# Patient Record
Sex: MALE | Race: WHITE | NOT HISPANIC OR LATINO | URBAN - NONMETROPOLITAN AREA
[De-identification: names, ages, dates, MRNs, and addresses within clinical notes are randomized per-mention and may not be internally consistent; named-entity substitution may affect disease eponyms.]

---

## 2019-12-30 ENCOUNTER — OUTSIDE FACILITY SERVICE (OUTPATIENT)
Dept: CARDIOLOGY | Facility: CLINIC | Age: 74
End: 2019-12-30

## 2019-12-30 PROCEDURE — 78452 HT MUSCLE IMAGE SPECT MULT: CPT | Performed by: INTERNAL MEDICINE

## 2019-12-30 PROCEDURE — 93018 CV STRESS TEST I&R ONLY: CPT | Performed by: INTERNAL MEDICINE

## 2021-02-17 DIAGNOSIS — Z23 IMMUNIZATION DUE: ICD-10-CM

## 2021-08-03 ENCOUNTER — TRANSCRIBE ORDERS (OUTPATIENT)
Dept: ADMINISTRATIVE | Facility: HOSPITAL | Age: 76
End: 2021-08-03

## 2021-08-03 DIAGNOSIS — Z01.818 PRE-OPERATIVE CLEARANCE: Primary | ICD-10-CM

## 2021-08-06 ENCOUNTER — LAB (OUTPATIENT)
Dept: LAB | Facility: HOSPITAL | Age: 76
End: 2021-08-06

## 2021-08-06 DIAGNOSIS — Z01.818 PRE-OPERATIVE CLEARANCE: ICD-10-CM

## 2021-08-06 LAB — SARS-COV-2 RNA PNL SPEC NAA+PROBE: NOT DETECTED

## 2021-08-06 PROCEDURE — U0004 COV-19 TEST NON-CDC HGH THRU: HCPCS | Performed by: OPHTHALMOLOGY

## 2021-08-06 PROCEDURE — C9803 HOPD COVID-19 SPEC COLLECT: HCPCS

## 2023-05-04 ENCOUNTER — APPOINTMENT (OUTPATIENT)
Dept: GENERAL RADIOLOGY | Facility: HOSPITAL | Age: 78
End: 2023-05-04
Payer: MEDICARE

## 2023-05-04 ENCOUNTER — HOSPITAL ENCOUNTER (EMERGENCY)
Facility: HOSPITAL | Age: 78
Discharge: HOME | End: 2023-05-05
Attending: EMERGENCY MEDICINE
Payer: MEDICARE

## 2023-05-04 DIAGNOSIS — J20.4 PARAINFLUENZA VIRUS BRONCHITIS: Primary | ICD-10-CM

## 2023-05-04 LAB
ALBUMIN SERPL-MCNC: 3.8 G/DL (ref 3.5–5.2)
ALBUMIN/GLOB SERPL: 1.1 G/DL
ALP SERPL-CCNC: 80 U/L (ref 39–117)
ALT SERPL W P-5'-P-CCNC: 17 U/L (ref 1–41)
ANION GAP SERPL CALCULATED.3IONS-SCNC: 10.1 MMOL/L (ref 5–15)
AST SERPL-CCNC: 21 U/L (ref 1–40)
B PARAPERT DNA SPEC QL NAA+PROBE: NOT DETECTED
B PERT DNA SPEC QL NAA+PROBE: NOT DETECTED
BACTERIA UR QL AUTO: ABNORMAL /HPF
BASOPHILS # BLD AUTO: 0.05 10*3/MM3 (ref 0–0.2)
BASOPHILS NFR BLD AUTO: 0.8 % (ref 0–1.5)
BILIRUB SERPL-MCNC: 0.3 MG/DL (ref 0–1.2)
BILIRUB UR QL STRIP: NEGATIVE
BUN SERPL-MCNC: 9 MG/DL (ref 8–23)
BUN/CREAT SERPL: 7.1 (ref 7–25)
C PNEUM DNA NPH QL NAA+NON-PROBE: NOT DETECTED
CALCIUM SPEC-SCNC: 8.9 MG/DL (ref 8.6–10.5)
CHLORIDE SERPL-SCNC: 104 MMOL/L (ref 98–107)
CLARITY UR: CLEAR
CO2 SERPL-SCNC: 24.9 MMOL/L (ref 22–29)
COLOR UR: YELLOW
CREAT SERPL-MCNC: 1.27 MG/DL (ref 0.76–1.27)
CRP SERPL-MCNC: <0.3 MG/DL (ref 0–0.5)
D DIMER PPP FEU-MCNC: 0.34 MCGFEU/ML (ref 0–0.75)
DEPRECATED RDW RBC AUTO: 44.5 FL (ref 37–54)
EGFRCR SERPLBLD CKD-EPI 2021: 58.9 ML/MIN/1.73
EOSINOPHIL # BLD AUTO: 0.35 10*3/MM3 (ref 0–0.4)
EOSINOPHIL NFR BLD AUTO: 5.9 % (ref 0.3–6.2)
ERYTHROCYTE [DISTWIDTH] IN BLOOD BY AUTOMATED COUNT: 12.6 % (ref 12.3–15.4)
ERYTHROCYTE [SEDIMENTATION RATE] IN BLOOD: 19 MM/HR (ref 0–20)
FLUAV SUBTYP SPEC NAA+PROBE: NOT DETECTED
FLUBV RNA ISLT QL NAA+PROBE: NOT DETECTED
GEN 5 2HR TROPONIN T REFLEX: 21 NG/L
GLOBULIN UR ELPH-MCNC: 3.5 GM/DL
GLUCOSE SERPL-MCNC: 93 MG/DL (ref 65–99)
GLUCOSE UR STRIP-MCNC: NEGATIVE MG/DL
HADV DNA SPEC NAA+PROBE: NOT DETECTED
HCOV 229E RNA SPEC QL NAA+PROBE: NOT DETECTED
HCOV HKU1 RNA SPEC QL NAA+PROBE: NOT DETECTED
HCOV NL63 RNA SPEC QL NAA+PROBE: NOT DETECTED
HCOV OC43 RNA SPEC QL NAA+PROBE: NOT DETECTED
HCT VFR BLD AUTO: 48 % (ref 37.5–51)
HGB BLD-MCNC: 16.4 G/DL (ref 13–17.7)
HGB UR QL STRIP.AUTO: NEGATIVE
HMPV RNA NPH QL NAA+NON-PROBE: NOT DETECTED
HPIV1 RNA ISLT QL NAA+PROBE: DETECTED
HPIV2 RNA SPEC QL NAA+PROBE: NOT DETECTED
HPIV3 RNA NPH QL NAA+PROBE: NOT DETECTED
HPIV4 P GENE NPH QL NAA+PROBE: NOT DETECTED
HYALINE CASTS UR QL AUTO: ABNORMAL /LPF
IMM GRANULOCYTES # BLD AUTO: 0.02 10*3/MM3 (ref 0–0.05)
IMM GRANULOCYTES NFR BLD AUTO: 0.3 % (ref 0–0.5)
KETONES UR QL STRIP: NEGATIVE
LEUKOCYTE ESTERASE UR QL STRIP.AUTO: ABNORMAL
LYMPHOCYTES # BLD AUTO: 3.14 10*3/MM3 (ref 0.7–3.1)
LYMPHOCYTES NFR BLD AUTO: 53 % (ref 19.6–45.3)
M PNEUMO IGG SER IA-ACNC: NOT DETECTED
MAGNESIUM SERPL-MCNC: 2.1 MG/DL (ref 1.6–2.4)
MCH RBC QN AUTO: 32.7 PG (ref 26.6–33)
MCHC RBC AUTO-ENTMCNC: 34.2 G/DL (ref 31.5–35.7)
MCV RBC AUTO: 95.8 FL (ref 79–97)
MONOCYTES # BLD AUTO: 0.66 10*3/MM3 (ref 0.1–0.9)
MONOCYTES NFR BLD AUTO: 11.1 % (ref 5–12)
NEUTROPHILS NFR BLD AUTO: 1.7 10*3/MM3 (ref 1.7–7)
NEUTROPHILS NFR BLD AUTO: 28.9 % (ref 42.7–76)
NITRITE UR QL STRIP: NEGATIVE
NRBC BLD AUTO-RTO: 0 /100 WBC (ref 0–0.2)
NT-PROBNP SERPL-MCNC: 179.7 PG/ML (ref 0–1800)
PH UR STRIP.AUTO: 6 [PH] (ref 5–8)
PLATELET # BLD AUTO: 208 10*3/MM3 (ref 140–450)
PMV BLD AUTO: 10.1 FL (ref 6–12)
POTASSIUM SERPL-SCNC: 3.8 MMOL/L (ref 3.5–5.2)
PROT SERPL-MCNC: 7.3 G/DL (ref 6–8.5)
PROT UR QL STRIP: ABNORMAL
QT INTERVAL: 366 MS
QTC INTERVAL: 406 MS
RBC # BLD AUTO: 5.01 10*6/MM3 (ref 4.14–5.8)
RBC # UR STRIP: ABNORMAL /HPF
REF LAB TEST METHOD: ABNORMAL
RHINOVIRUS RNA SPEC NAA+PROBE: NOT DETECTED
RSV RNA NPH QL NAA+NON-PROBE: NOT DETECTED
SARS-COV-2 RNA NPH QL NAA+NON-PROBE: NOT DETECTED
SODIUM SERPL-SCNC: 139 MMOL/L (ref 136–145)
SP GR UR STRIP: 1.01 (ref 1–1.03)
SQUAMOUS #/AREA URNS HPF: ABNORMAL /HPF
T4 FREE SERPL-MCNC: 1.3 NG/DL (ref 0.93–1.7)
TROPONIN T DELTA: -1 NG/L
TROPONIN T SERPL HS-MCNC: 22 NG/L
TSH SERPL DL<=0.05 MIU/L-ACNC: 2.63 UIU/ML (ref 0.27–4.2)
UROBILINOGEN UR QL STRIP: ABNORMAL
WBC # UR STRIP: ABNORMAL /HPF
WBC NRBC COR # BLD: 5.92 10*3/MM3 (ref 3.4–10.8)

## 2023-05-04 PROCEDURE — 80053 COMPREHEN METABOLIC PANEL: CPT | Performed by: EMERGENCY MEDICINE

## 2023-05-04 PROCEDURE — 84439 ASSAY OF FREE THYROXINE: CPT | Performed by: EMERGENCY MEDICINE

## 2023-05-04 PROCEDURE — 83880 ASSAY OF NATRIURETIC PEPTIDE: CPT | Performed by: EMERGENCY MEDICINE

## 2023-05-04 PROCEDURE — 36415 COLL VENOUS BLD VENIPUNCTURE: CPT

## 2023-05-04 PROCEDURE — 85025 COMPLETE CBC W/AUTO DIFF WBC: CPT | Performed by: EMERGENCY MEDICINE

## 2023-05-04 PROCEDURE — 86140 C-REACTIVE PROTEIN: CPT | Performed by: EMERGENCY MEDICINE

## 2023-05-04 PROCEDURE — 83735 ASSAY OF MAGNESIUM: CPT | Performed by: EMERGENCY MEDICINE

## 2023-05-04 PROCEDURE — 0202U NFCT DS 22 TRGT SARS-COV-2: CPT | Performed by: EMERGENCY MEDICINE

## 2023-05-04 PROCEDURE — 93005 ELECTROCARDIOGRAM TRACING: CPT | Performed by: EMERGENCY MEDICINE

## 2023-05-04 PROCEDURE — 84443 ASSAY THYROID STIM HORMONE: CPT | Performed by: EMERGENCY MEDICINE

## 2023-05-04 PROCEDURE — 85379 FIBRIN DEGRADATION QUANT: CPT | Performed by: EMERGENCY MEDICINE

## 2023-05-04 PROCEDURE — 81001 URINALYSIS AUTO W/SCOPE: CPT | Performed by: EMERGENCY MEDICINE

## 2023-05-04 PROCEDURE — 96361 HYDRATE IV INFUSION ADD-ON: CPT

## 2023-05-04 PROCEDURE — 96360 HYDRATION IV INFUSION INIT: CPT

## 2023-05-04 PROCEDURE — 84484 ASSAY OF TROPONIN QUANT: CPT | Performed by: EMERGENCY MEDICINE

## 2023-05-04 PROCEDURE — 85652 RBC SED RATE AUTOMATED: CPT | Performed by: EMERGENCY MEDICINE

## 2023-05-04 PROCEDURE — 71045 X-RAY EXAM CHEST 1 VIEW: CPT

## 2023-05-04 PROCEDURE — 99284 EMERGENCY DEPT VISIT MOD MDM: CPT

## 2023-05-04 RX ORDER — LISINOPRIL 10 MG/1
10 TABLET ORAL DAILY
Qty: 30 TABLET | Refills: 0 | Status: SHIPPED | OUTPATIENT
Start: 2023-05-04

## 2023-05-04 RX ORDER — SODIUM CHLORIDE 9 MG/ML
125 INJECTION, SOLUTION INTRAVENOUS CONTINUOUS
Status: DISCONTINUED | OUTPATIENT
Start: 2023-05-04 | End: 2023-05-05 | Stop reason: HOSPADM

## 2023-05-04 RX ORDER — GUAIFENESIN/DEXTROMETHORPHAN 100-10MG/5
10 SYRUP ORAL ONCE
Status: COMPLETED | OUTPATIENT
Start: 2023-05-05 | End: 2023-05-05

## 2023-05-04 RX ORDER — GUAIFENESIN DEXTROMETHORPHAN HYDROBROMIDE ORAL SOLUTION 10; 100 MG/5ML; MG/5ML
10 SOLUTION ORAL 4 TIMES DAILY PRN
Qty: 180 ML | Refills: 0 | Status: SHIPPED | OUTPATIENT
Start: 2023-05-04

## 2023-05-04 RX ADMIN — SODIUM CHLORIDE 125 ML/HR: 9 INJECTION, SOLUTION INTRAVENOUS at 21:38

## 2023-05-04 NOTE — ED NOTES
Pt provided urine collection supplies and advised the need for sample. Pt verbalized understanding

## 2023-05-04 NOTE — ED NOTES
MEDICAL SCREENING:    Reason for Visit: Chest Pain and Weakness    Patient initially seen in triage.  The patient was advised further evaluation and diagnostic testing will be needed, some of the treatment and testing will be initiated in the lobby in order to begin the process.  The patient will be returned to the waiting area for the time being and possibly be re-assessed by a subsequent ED provider.  The patient will be brought back to the treatment area in as timely manner as possible.       Rell Morgan MD  05/04/23 1349

## 2023-05-05 VITALS
BODY MASS INDEX: 28.63 KG/M2 | DIASTOLIC BLOOD PRESSURE: 84 MMHG | TEMPERATURE: 98 F | SYSTOLIC BLOOD PRESSURE: 188 MMHG | WEIGHT: 200 LBS | HEIGHT: 70 IN | RESPIRATION RATE: 20 BRPM | HEART RATE: 60 BPM | OXYGEN SATURATION: 96 %

## 2023-05-05 RX ORDER — LISINOPRIL 10 MG/1
10 TABLET ORAL ONCE
Status: COMPLETED | OUTPATIENT
Start: 2023-05-05 | End: 2023-05-05

## 2023-05-05 RX ADMIN — LISINOPRIL 10 MG: 10 TABLET ORAL at 00:27

## 2023-05-05 RX ADMIN — GUAIFENESIN AND DEXTROMETHORPHAN 10 ML: 100; 10 SYRUP ORAL at 00:12

## 2023-05-05 NOTE — ED PROVIDER NOTES
Subjective   History of Present Illness  Patient is a 77-year-old male who complains that he has been coughing for about 2 to 3 weeks, and that for the last week or so whenever he coughs it hurts in his left anterior chest, sometimes in his left posterior thorax as well.  This pain is described as sharp in character.  He is not having any other sort of chest pain.  His cough is nonproductive.  He has at times felt mildly short of breath associated with the symptoms.  He states that he is not having any pain right now, not feeling dyspneic currently.  He denies fever, chills, sputum production, hemoptysis, abdominal pain, vomiting, syncope or near syncope, focal numbness or weakness, other symptoms or other complaints.  Patient does have a history of coronary artery disease, reports 2 heart attacks in the past with one coronary artery stent.  He stopped following with cardiology, he does not go to primary care either.  He quit taking all of his medications except for a daily aspirin.  Patient does report some alcohol use.  He does not smoke, he chews tobacco.  He is accompanied by his daughter.        Review of Systems   All other systems reviewed and are negative.      Past Medical History:   Diagnosis Date   • BPH (benign prostatic hypertrophy)    • Difficulty urinating        No Known Allergies    Past Surgical History:   Procedure Laterality Date   • BACK SURGERY     • CHOLECYSTECTOMY     • NECK SURGERY     • REPLACEMENT TOTAL KNEE BILATERAL     • TOTAL HIP ARTHROPLASTY         Family History   Problem Relation Age of Onset   • No Known Problems Father    • No Known Problems Mother        Social History     Socioeconomic History   • Marital status:    Tobacco Use   • Smoking status: Never   • Smokeless tobacco: Current   Substance and Sexual Activity   • Alcohol use: No   • Drug use: No           Objective   Physical Exam  Vitals and nursing note reviewed.   Constitutional:       General: He is not in  acute distress.     Appearance: Normal appearance. He is well-developed. He is not ill-appearing, toxic-appearing or diaphoretic.   HENT:      Head: Normocephalic and atraumatic.   Eyes:      General: No scleral icterus.     Pupils: Pupils are equal, round, and reactive to light.   Neck:      Trachea: No tracheal deviation.   Cardiovascular:      Rate and Rhythm: Normal rate and regular rhythm.   Pulmonary:      Effort: Pulmonary effort is normal. No respiratory distress.      Breath sounds: Normal breath sounds.   Chest:      Chest wall: Tenderness (There is mild tenderness to palpation of the left anterior chest.) present.   Abdominal:      General: Bowel sounds are normal.      Palpations: Abdomen is soft.      Tenderness: There is no abdominal tenderness. There is no guarding or rebound.   Musculoskeletal:         General: No tenderness. Normal range of motion.      Cervical back: Normal range of motion and neck supple. No rigidity or tenderness.      Right lower leg: No tenderness. No edema.      Left lower leg: No tenderness. No edema.   Skin:     General: Skin is warm and dry.      Capillary Refill: Capillary refill takes less than 2 seconds.      Coloration: Skin is not pale.   Neurological:      General: No focal deficit present.      Mental Status: He is alert and oriented to person, place, and time.      GCS: GCS eye subscore is 4. GCS verbal subscore is 5. GCS motor subscore is 6.      Motor: No abnormal muscle tone.   Psychiatric:         Mood and Affect: Mood normal.         Behavior: Behavior normal.         Procedures  EKG shows sinus rhythm, rate 74.  WI interval 238, QRS duration 88, QTc 406 ms.  Some baseline artifact.  No apparent acute ischemia.  No evidence for STEMI.  No significant change compared with the only EKG we have on file from 3/16/2014.  XR Chest 1 View   Final Result       No evidence of acute disease in the chest.       This report was finalized on 5/4/2023 7:02 PM by Tristin  MD Kaleb.            Results for orders placed or performed during the hospital encounter of 05/04/23   Respiratory Panel PCR w/COVID-19(SARS-CoV-2) NAILA/YANELY/JAIMEE/PAD/COR/MAD/KOMAL In-House, NP Swab in UTM/VTM, 3-4 HR TAT - Swab, Nasopharynx    Specimen: Nasopharynx; Swab   Result Value Ref Range    ADENOVIRUS, PCR Not Detected Not Detected    Coronavirus 229E Not Detected Not Detected    Coronavirus HKU1 Not Detected Not Detected    Coronavirus NL63 Not Detected Not Detected    Coronavirus OC43 Not Detected Not Detected    COVID19 Not Detected Not Detected - Ref. Range    Human Metapneumovirus Not Detected Not Detected    Human Rhinovirus/Enterovirus Not Detected Not Detected    Influenza A PCR Not Detected Not Detected    Influenza B PCR Not Detected Not Detected    Parainfluenza Virus 1 Detected (A) Not Detected    Parainfluenza Virus 2 Not Detected Not Detected    Parainfluenza Virus 3 Not Detected Not Detected    Parainfluenza Virus 4 Not Detected Not Detected    RSV, PCR Not Detected Not Detected    Bordetella pertussis pcr Not Detected Not Detected    Bordetella parapertussis PCR Not Detected Not Detected    Chlamydophila pneumoniae PCR Not Detected Not Detected    Mycoplasma pneumo by PCR Not Detected Not Detected   Comprehensive Metabolic Panel    Specimen: Arm, Left; Blood   Result Value Ref Range    Glucose 93 65 - 99 mg/dL    BUN 9 8 - 23 mg/dL    Creatinine 1.27 0.76 - 1.27 mg/dL    Sodium 139 136 - 145 mmol/L    Potassium 3.8 3.5 - 5.2 mmol/L    Chloride 104 98 - 107 mmol/L    CO2 24.9 22.0 - 29.0 mmol/L    Calcium 8.9 8.6 - 10.5 mg/dL    Total Protein 7.3 6.0 - 8.5 g/dL    Albumin 3.8 3.5 - 5.2 g/dL    ALT (SGPT) 17 1 - 41 U/L    AST (SGOT) 21 1 - 40 U/L    Alkaline Phosphatase 80 39 - 117 U/L    Total Bilirubin 0.3 0.0 - 1.2 mg/dL    Globulin 3.5 gm/dL    A/G Ratio 1.1 g/dL    BUN/Creatinine Ratio 7.1 7.0 - 25.0    Anion Gap 10.1 5.0 - 15.0 mmol/L    eGFR 58.9 (L) >60.0 mL/min/1.73   Urinalysis With  Microscopic If Indicated (No Culture) - Urine, Clean Catch    Specimen: Urine, Clean Catch   Result Value Ref Range    Color, UA Yellow Yellow, Straw    Appearance, UA Clear Clear    pH, UA 6.0 5.0 - 8.0    Specific Gravity, UA 1.015 1.005 - 1.030    Glucose, UA Negative Negative    Ketones, UA Negative Negative    Bilirubin, UA Negative Negative    Blood, UA Negative Negative    Protein, UA 30 mg/dL (1+) (A) Negative    Leuk Esterase, UA Trace (A) Negative    Nitrite, UA Negative Negative    Urobilinogen, UA 0.2 E.U./dL 0.2 - 1.0 E.U./dL   High Sensitivity Troponin T    Specimen: Arm, Left; Blood   Result Value Ref Range    HS Troponin T 22 (H) <15 ng/L   BNP    Specimen: Arm, Left; Blood   Result Value Ref Range    proBNP 179.7 0.0 - 1,800.0 pg/mL   Sedimentation Rate    Specimen: Arm, Left; Blood   Result Value Ref Range    Sed Rate 19 0 - 20 mm/hr   C-reactive Protein    Specimen: Arm, Left; Blood   Result Value Ref Range    C-Reactive Protein <0.30 0.00 - 0.50 mg/dL   T4, Free    Specimen: Arm, Left; Blood   Result Value Ref Range    Free T4 1.30 0.93 - 1.70 ng/dL   TSH    Specimen: Arm, Left; Blood   Result Value Ref Range    TSH 2.630 0.270 - 4.200 uIU/mL   Magnesium    Specimen: Arm, Left; Blood   Result Value Ref Range    Magnesium 2.1 1.6 - 2.4 mg/dL   CBC Auto Differential    Specimen: Arm, Left; Blood   Result Value Ref Range    WBC 5.92 3.40 - 10.80 10*3/mm3    RBC 5.01 4.14 - 5.80 10*6/mm3    Hemoglobin 16.4 13.0 - 17.7 g/dL    Hematocrit 48.0 37.5 - 51.0 %    MCV 95.8 79.0 - 97.0 fL    MCH 32.7 26.6 - 33.0 pg    MCHC 34.2 31.5 - 35.7 g/dL    RDW 12.6 12.3 - 15.4 %    RDW-SD 44.5 37.0 - 54.0 fl    MPV 10.1 6.0 - 12.0 fL    Platelets 208 140 - 450 10*3/mm3    Neutrophil % 28.9 (L) 42.7 - 76.0 %    Lymphocyte % 53.0 (H) 19.6 - 45.3 %    Monocyte % 11.1 5.0 - 12.0 %    Eosinophil % 5.9 0.3 - 6.2 %    Basophil % 0.8 0.0 - 1.5 %    Immature Grans % 0.3 0.0 - 0.5 %    Neutrophils, Absolute 1.70 1.70 - 7.00  10*3/mm3    Lymphocytes, Absolute 3.14 (H) 0.70 - 3.10 10*3/mm3    Monocytes, Absolute 0.66 0.10 - 0.90 10*3/mm3    Eosinophils, Absolute 0.35 0.00 - 0.40 10*3/mm3    Basophils, Absolute 0.05 0.00 - 0.20 10*3/mm3    Immature Grans, Absolute 0.02 0.00 - 0.05 10*3/mm3    nRBC 0.0 0.0 - 0.2 /100 WBC   Urinalysis, Microscopic Only - Urine, Clean Catch    Specimen: Urine, Clean Catch   Result Value Ref Range    RBC, UA 3-5 (A) None Seen, 0-2 /HPF    WBC, UA 6-12 (A) None Seen, 0-2 /HPF    Bacteria, UA None Seen None Seen /HPF    Squamous Epithelial Cells, UA 0-2 None Seen, 0-2 /HPF    Hyaline Casts, UA None Seen None Seen /LPF    Methodology Automated Microscopy    High Sensitivity Troponin T 2Hr    Specimen: Hand, Left; Blood   Result Value Ref Range    HS Troponin T 21 (H) <15 ng/L    Troponin T Delta -1 >=-4 - <+4 ng/L   D-dimer, Quantitative    Specimen: Hand, Left; Blood   Result Value Ref Range    D-Dimer, Quantitative 0.34 0.00 - 0.75 MCGFEU/mL   ECG 12 Lead Chest Pain   Result Value Ref Range    QT Interval 366 ms    QTC Interval 406 ms                ED Course  ED Course as of 05/05/23 0423   Thu May 04, 2023   1753 ECG 12 Lead Chest Pain  Vent. Rate :  74 BPM     Atrial Rate :  74 BPM     P-R Int : 238 ms          QRS Dur :  88 ms      QT Int : 366 ms       P-R-T Axes :  45 -31  23 degrees     QTc Int : 406 ms     Sinus rhythm with 1st degree AV block  Left axis deviation  Minimal voltage criteria for LVH, may be normal variant  Inferior infarct , age undetermined  Anterior infarct , age undetermined  Abnormal ECG  No previous ECGs available [ES]   2345 Patient's emergency department stay has been uneventful.  We discussed his test results and his plan of care.  He and his daughter voiced understanding and agreement.  I am referring him to cardiology on-call, he advises that he will follow-up with them.  He will return to the emergency department right away if symptoms worsen/any problems. [CM]      ED Course  User Index  [CM] Anthony Nava MD  [ES] Rell Morgan MD                                           [facility]    Final diagnoses:   Parainfluenza virus bronchitis       ED Disposition  ED Disposition     ED Disposition   Discharge    Condition   Stable    Comment   --             Mis Olson MD  [address]  [place] [id number] [phone]    Go to   Call tomorrow morning to schedule first available appointment    [facility] Emergency Department  [address] [facility] [address] [phone]  Go to   If symptoms worsen         Medication List      New Prescriptions    dextromethorphan-guaifenesin  MG/5ML liquid  Commonly known as: ROBITUSSIN-DM  Take 10 mL by mouth 4 (Four) Times a Day As Needed for Cough.        Changed    lisinopril 10 MG tablet  Commonly known as: PRINIVILZESTRIL  Take 1 tablet by mouth Daily.  What changed:   · how much to take  · how to take this  · when to take this        Stop    gabapentin 800 MG tablet  Commonly known as: NEURONTIN     OxyCONTIN 30 MG tablet extended-release 12 hour  Generic drug: oxyCODONE HCl ER     tamsulosin 0.4 MG capsule 24 hr capsule  Commonly known as: Flomax           Where to Get Your Medications      These medications were sent to [id number] - [place], [place] - [address] [place] [address] CTR AT [facility] - [phone] [facility] [phone] FX  [address] [address]    Phone: [phone]   · dextromethorphan-guaifenesin  MG/5ML liquid  · lisinopril 10 MG tablet       Please note that portions of this note were completed with a voice recognition program.        Anthony Nava MD  05/05/23 3627

## 2023-05-05 NOTE — DISCHARGE INSTRUCTIONS
Home in care of family.  Rest.  Take your medication as prescribed.  Drink plenty of fluids.  Call Dr. Olson's office in the morning to schedule his next available follow-up appointment.  Return to the emergency department right away if symptoms worsen/any problems.